# Patient Record
Sex: FEMALE | Race: WHITE | NOT HISPANIC OR LATINO | Employment: FULL TIME | URBAN - METROPOLITAN AREA
[De-identification: names, ages, dates, MRNs, and addresses within clinical notes are randomized per-mention and may not be internally consistent; named-entity substitution may affect disease eponyms.]

---

## 2019-11-25 ENCOUNTER — HOSPITAL ENCOUNTER (EMERGENCY)
Facility: HOSPITAL | Age: 29
Discharge: HOME | End: 2019-11-25
Attending: EMERGENCY MEDICINE | Admitting: EMERGENCY MEDICINE
Payer: OTHER MISCELLANEOUS

## 2019-11-25 VITALS
DIASTOLIC BLOOD PRESSURE: 71 MMHG | TEMPERATURE: 97.9 F | OXYGEN SATURATION: 99 % | SYSTOLIC BLOOD PRESSURE: 137 MMHG | WEIGHT: 219.8 LBS | HEART RATE: 84 BPM | RESPIRATION RATE: 18 BRPM

## 2019-11-25 DIAGNOSIS — Z20.3 NEED FOR POST EXPOSURE PROPHYLAXIS FOR RABIES: Primary | ICD-10-CM

## 2019-11-25 PROCEDURE — 96372 THER/PROPH/DIAG INJ SC/IM: CPT

## 2019-11-25 PROCEDURE — 90472 IMMUNIZATION ADMIN EACH ADD: CPT

## 2019-11-25 PROCEDURE — 90375 RABIES IG IM/SC: CPT | Performed by: EMERGENCY MEDICINE

## 2019-11-25 PROCEDURE — 90715 TDAP VACCINE 7 YRS/> IM: CPT | Performed by: EMERGENCY MEDICINE

## 2019-11-25 PROCEDURE — 99281 EMR DPT VST MAYX REQ PHY/QHP: CPT

## 2019-11-25 PROCEDURE — 90675 RABIES VACCINE IM: CPT | Performed by: EMERGENCY MEDICINE

## 2019-11-25 PROCEDURE — 99282 EMERGENCY DEPT VISIT SF MDM: CPT | Performed by: EMERGENCY MEDICINE

## 2019-11-25 PROCEDURE — 90471 IMMUNIZATION ADMIN: CPT

## 2019-11-25 RX ADMIN — RABIES IMMUNE GLOBULIN (HUMAN) 1980 UNITS: 300 INJECTION, SOLUTION INFILTRATION; INTRAMUSCULAR at 16:36

## 2019-11-25 RX ADMIN — Medication 1 ML: at 16:35

## 2019-11-25 RX ADMIN — TETANUS TOXOID, REDUCED DIPHTHERIA TOXOID AND ACELLULAR PERTUSSIS VACCINE, ADSORBED 0.5 ML: 5; 2.5; 8; 8; 2.5 SUSPENSION INTRAMUSCULAR at 16:38

## 2019-11-25 NOTE — ED PROVIDER NOTES
History  Chief Complaint   Patient presents with    Follow Up Rabies     Pt reports she was told to come in and get the rabies vaccine after getting bit by an unvaccinated dog  Pt has not started series yet  34year old female patient presents to the ED with cc of dog bite  There is a small puncture on the right wrist  She does not require medical care for this but the dog only got its initial vaccine and no booster so she will get the rabies vaccine and immunoglobulin  History provided by:  Patient   used: No    Dog Bite   Contact animal:  Dog  Location:  Hand  Hand injury location:  R wrist  Pain details:     Severity:  No pain    Timing:  Constant    Progression:  Unchanged  Incident location:  Outside  Provoked: unprovoked    Notifications:  Animal control  Animal in possession: yes    Relieved by:  Nothing  Worsened by:  Nothing  Ineffective treatments:  None tried  Associated symptoms: no fever, no numbness, no rash and no swelling        None       History reviewed  No pertinent past medical history  History reviewed  No pertinent surgical history  History reviewed  No pertinent family history  I have reviewed and agree with the history as documented  Social History     Tobacco Use    Smoking status: Never Smoker    Smokeless tobacco: Never Used   Substance Use Topics    Alcohol use: Yes     Comment: socially    Drug use: Never        Review of Systems   Constitutional: Negative for fever  Skin: Negative for rash  Neurological: Negative for numbness  All other systems reviewed and are negative  Physical Exam  Physical Exam   Constitutional: She is oriented to person, place, and time  She appears well-developed and well-nourished  HENT:   Head: Normocephalic and atraumatic  Right Ear: External ear normal    Left Ear: External ear normal    Eyes: Conjunctivae and EOM are normal    Neck: No JVD present  No tracheal deviation present   No thyromegaly present  Cardiovascular: Normal rate  Pulmonary/Chest: Effort normal and breath sounds normal  No stridor  Abdominal: Soft  She exhibits no distension and no mass  There is no tenderness  There is no guarding  No hernia  Musculoskeletal: Normal range of motion  She exhibits no edema, tenderness or deformity  Lymphadenopathy:     She has no cervical adenopathy  Neurological: She is alert and oriented to person, place, and time  Skin: Skin is warm  No rash noted  No erythema  No pallor  Psychiatric: She has a normal mood and affect  Her behavior is normal    Nursing note and vitals reviewed        Vital Signs  ED Triage Vitals [11/25/19 1558]   Temperature Pulse Respirations Blood Pressure SpO2   97 9 °F (36 6 °C) 84 18 137/71 99 %      Temp Source Heart Rate Source Patient Position - Orthostatic VS BP Location FiO2 (%)   Oral Monitor Sitting Left arm --      Pain Score       --           Vitals:    11/25/19 1558   BP: 137/71   Pulse: 84   Patient Position - Orthostatic VS: Sitting         Visual Acuity      ED Medications  Medications   rabies vaccine, human diploid (IMOVAX RABIES) IM injection 1 mL (1 mL Intramuscular Given 11/25/19 1635)   tetanus-diphtheria-acellular pertussis (BOOSTRIX) IM injection 0 5 mL (0 5 mL Intramuscular Given 11/25/19 1638)   rabies immune globulin, human (HyperRAB) injection 1,980 Units (1,980 Units Infiltration Given 11/25/19 1636)       Diagnostic Studies  Results Reviewed     None                 No orders to display              Procedures  Procedures       ED Course                               MDM  Number of Diagnoses or Management Options  Need for post exposure prophylaxis for rabies: new and requires workup     Amount and/or Complexity of Data Reviewed  Decide to obtain previous medical records or to obtain history from someone other than the patient: yes  Review and summarize past medical records: yes    Patient Progress  Patient progress: stable      Disposition  Final diagnoses:   Need for post exposure prophylaxis for rabies     Time reflects when diagnosis was documented in both MDM as applicable and the Disposition within this note     Time User Action Codes Description Comment    11/25/2019  4:32 PM Emani Kathikarena Add [Z20 3] Need for post exposure prophylaxis for rabies       ED Disposition     ED Disposition Condition Date/Time Comment    Discharge Stable Mon Nov 25, 2019  4:32 PM Blaire Chung discharge to home/self care  Follow-up Information     Follow up With Specialties Details Why Contact Info Additional Information    [address] [facility] Emergency Department Emergency Medicine   [address] [facility] [id number] [phone] [place] ED, [address] [place], [place], [address] Now [address] Urgent [facility]   [address] [place] [address]  [phone] [facility] NOW, [address], [address] [facility]          There are no discharge medications for this patient  No discharge procedures on file      ED Provider  Electronically Signed by           Merari Conner DO  11/26/19 8170

## 2019-11-28 ENCOUNTER — APPOINTMENT (EMERGENCY)
Dept: RADIOLOGY | Facility: HOSPITAL | Age: 29
End: 2019-11-28

## 2019-11-28 ENCOUNTER — HOSPITAL ENCOUNTER (EMERGENCY)
Facility: HOSPITAL | Age: 29
Discharge: HOME | End: 2019-11-28
Attending: EMERGENCY MEDICINE

## 2019-11-28 VITALS
RESPIRATION RATE: 18 BRPM | BODY MASS INDEX: 35.32 KG/M2 | HEIGHT: 66 IN | DIASTOLIC BLOOD PRESSURE: 69 MMHG | WEIGHT: 219.8 LBS | HEART RATE: 98 BPM | SYSTOLIC BLOOD PRESSURE: 141 MMHG | OXYGEN SATURATION: 99 % | TEMPERATURE: 97.5 F

## 2019-11-28 DIAGNOSIS — S60.10XA: Primary | ICD-10-CM

## 2019-11-28 DIAGNOSIS — S67.10XA CRUSH INJURY TO FINGER: ICD-10-CM

## 2019-11-28 DIAGNOSIS — S62.639B OPEN FRACTURE OF TUFT OF DISTAL PHALANX OF FINGER: ICD-10-CM

## 2019-11-28 PROCEDURE — 73130 X-RAY EXAM OF HAND: CPT

## 2019-11-28 PROCEDURE — 99284 EMERGENCY DEPT VISIT MOD MDM: CPT

## 2019-11-28 PROCEDURE — 99284 EMERGENCY DEPT VISIT MOD MDM: CPT | Performed by: EMERGENCY MEDICINE

## 2019-11-28 RX ORDER — CEPHALEXIN 500 MG/1
500 CAPSULE ORAL EVERY 6 HOURS SCHEDULED
Qty: 20 CAPSULE | Refills: 0 | Status: SHIPPED | OUTPATIENT
Start: 2019-11-28 | End: 2019-12-03

## 2019-11-28 RX ORDER — CEPHALEXIN 250 MG/1
500 CAPSULE ORAL ONCE
Status: COMPLETED | OUTPATIENT
Start: 2019-11-28 | End: 2019-11-28

## 2019-11-28 RX ORDER — LIDOCAINE HYDROCHLORIDE 20 MG/ML
5 INJECTION, SOLUTION EPIDURAL; INFILTRATION; INTRACAUDAL; PERINEURAL ONCE
Status: COMPLETED | OUTPATIENT
Start: 2019-11-28 | End: 2019-11-28

## 2019-11-28 RX ADMIN — LIDOCAINE HYDROCHLORIDE 5 ML: 20 INJECTION, SOLUTION EPIDURAL; INFILTRATION; INTRACAUDAL; PERINEURAL at 04:10

## 2019-11-28 RX ADMIN — CEPHALEXIN 500 MG: 250 CAPSULE ORAL at 04:13

## 2019-11-28 NOTE — ED PROVIDER NOTES
History  Chief Complaint   Patient presents with    Hand Injury     wood door slammed on right hand, presents with c/o pain     59-year-old female presents with crush injury to the right 3rd and 4th digits in a door  Patient notes pain over the distal fingers in the area of the distal phalanx, beneath the nails  Patient has nail [place] on but there appears to be a subungual hematomas in these regions  Patient denies any pain proximal to this or pain anywhere else on her body  Patient denies any difficulty with range of motion  Impression and plan:  Hand injury  Will obtain plain film imaging to evaluate for potential tuft fractures or other fractures to the distal phalanx  Will remove patient's nail Polish to more fully evaluate for subungual hematomas  Will monitor and reassess need for additional treatment parent      Hand Injury   Location:  Hand  Hand location:  R hand  Injury: yes    Mechanism of injury: crush    Crush injury:     Mechanism:  Door  Pain details:     Quality:  Pressure and throbbing    Radiates to:  Does not radiate    Severity:  Moderate    Onset quality:  Sudden    Timing:  Constant    Progression:  Worsening  Relieved by: Ice  Worsened by:  Nothing  Associated symptoms: swelling    Associated symptoms: no back pain, no decreased range of motion, no fatigue, no fever, no muscle weakness, no neck pain, no numbness, no stiffness and no tingling        None       History reviewed  No pertinent past medical history  History reviewed  No pertinent surgical history  History reviewed  No pertinent family history  I have reviewed and agree with the history as documented  Social History     Tobacco Use    Smoking status: Never Smoker    Smokeless tobacco: Never Used   Substance Use Topics    Alcohol use: Yes     Comment: socially    Drug use: Never        Review of Systems   Constitutional: Negative for fatigue and fever     Musculoskeletal: Negative for back pain, neck pain and stiffness  All other systems reviewed and are negative  Physical Exam  Physical Exam   Constitutional: She appears well-developed and well-nourished  She appears distressed  HENT:   Head: Normocephalic and atraumatic  Eyes: Pupils are equal, round, and reactive to light  Neck: Normal range of motion  Neck supple  Cardiovascular: Normal rate and regular rhythm  Pulmonary/Chest: Effort normal    Abdominal: She exhibits no distension  Musculoskeletal: She exhibits tenderness  She exhibits no deformity  Right hand:  Likely subungual hematomas in the 3rd and 4th digits  Otherwise normal inspection of the hand  Tenderness over the distal phalanx is the 3rd and 4th right digits  No tenderness in the remainder of the hand including no tenderness at the wrist   Normal range of motion  Normal distal neurovascular exam    Neurological: She is alert  Skin: Skin is warm and dry  She is not diaphoretic  Psychiatric: She has a normal mood and affect  Vitals reviewed  Vital Signs  ED Triage Vitals [11/28/19 0225]   Temperature Pulse Respirations Blood Pressure SpO2   97 5 °F (36 4 °C) 98 18 141/69 99 %      Temp Source Heart Rate Source Patient Position - Orthostatic VS BP Location FiO2 (%)   Oral Monitor Sitting Left arm --      Pain Score       4           Vitals:    11/28/19 0225   BP: 141/69   Pulse: 98   Patient Position - Orthostatic VS: Sitting         Visual Acuity      ED Medications  Medications   lidocaine (PF) (XYLOCAINE-MPF) 2 % injection 5 mL (5 mL Infiltration Given 11/28/19 0410)   cephalexin (KEFLEX) capsule 500 mg (500 mg Oral Given 11/28/19 0413)       Diagnostic Studies  Results Reviewed     None                 XR hand 3+ views RIGHT   ED Interpretation by Dominik Rose MD (11/28 0567)   Abnormal   3rd digit tuft fx      Final Result by Addy Harper DO (11/28 3925)      Acute 3rd digit tuft fracture              Workstation performed: [id number] Procedures  Procedures       ED Course  ED Course as of Nov 29 0132   Thu Nov 28, 2019   0401 Patient's finger was anesthetized with a proximal nerve block after cleaning with chlorhexidine  1 mL of 2% lidocaine was administered to the 3rd and 4th digits  Patient's 3rd and 4th digits trephinated with electric cautery with return of blood and improvement of the color of the proximal nail bed  Patient will be placed on antibiotics due to open fracture  Patient to follow with Orthopedics for reassessment and was placed in a thermoform splint to isolate the distal interphalangeal joint  I discussed follow-up and return precautions in detail  Discussed continued antibiotic use and symptomatic management including ice and elevation  Discussed splint care  MDM    Disposition  Final diagnoses:   Subungual hematoma, fingernail   Open fracture of tuft of distal phalanx of finger - right 3rd digit   Crush injury to finger     Time reflects when diagnosis was documented in both MDM as applicable and the Disposition within this note     Time User Action Codes Description Comment    11/28/2019  4:09 AM Rheba Odessa Add [S60 10XA] Subungual hematoma, fingernail     11/28/2019  4:09 AM Rheba Wanda Add [S62 639B] Open fracture of tuft of distal phalanx of finger     11/28/2019  4:09 AM Rheba Wanda Modify [S62 639B] Open fracture of tuft of distal phalanx of finger right 3rd digit    11/28/2019  4:09 AM Rheba Wanda Add [L78 56EA] Crush injury to finger       ED Disposition     ED Disposition Condition Date/Time Comment    Discharge Stable Thu Nov 28, 2019  4:08 AM Dorina Angela discharge to home/self care              Follow-up Information     Follow up With Specialties Details Why Contact Info Additional Information    Obey Meade MD Orthopedic Surgery, Hand Surgery, Orthopedics Schedule an appointment as soon as possible for a visit in 1 week Follow up on finger fracture  [address] [address]  [address], Emergency Department Emergency Medicine Go to  If symptoms worsen [address] [facility] Antoni Celaya [address] ED, [address] [place], [place], [address]          Discharge Medication List as of 11/28/2019  4:12 AM      START taking these medications    Details   cephalexin (KEFLEX) 500 mg capsule Take 1 capsule (500 mg total) by mouth every 6 (six) hours for 5 days, Starting Thu 11/28/2019, Until Tue 12/3/2019, Print           No discharge procedures on file      ED Provider  Electronically Signed by           Silver Kasper MD  11/29/19 7972

## 2019-11-28 NOTE — DISCHARGE INSTRUCTIONS
Keep splint in place for at least 2 weeks and up to 4 weeks until finger completely healed  Follow up with hand surgery for reassessment

## 2019-11-29 ENCOUNTER — APPOINTMENT (OUTPATIENT)
Dept: URGENT CARE | Facility: MEDICAL CENTER | Age: 29
End: 2019-11-29
Payer: OTHER MISCELLANEOUS

## 2019-11-29 PROCEDURE — 90675 RABIES VACCINE IM: CPT

## 2019-11-29 PROCEDURE — 99283 EMERGENCY DEPT VISIT LOW MDM: CPT | Performed by: PHYSICIAN ASSISTANT

## 2019-11-29 PROCEDURE — 90471 IMMUNIZATION ADMIN: CPT | Performed by: PREVENTIVE MEDICINE

## 2019-11-29 PROCEDURE — G0382 LEV 3 HOSP TYPE B ED VISIT: HCPCS | Performed by: PHYSICIAN ASSISTANT

## 2019-12-03 ENCOUNTER — OCCMED (OUTPATIENT)
Dept: URGENT CARE | Facility: MEDICAL CENTER | Age: 29
End: 2019-12-03
Payer: OTHER MISCELLANEOUS

## 2019-12-03 PROCEDURE — 90675 RABIES VACCINE IM: CPT

## 2019-12-03 PROCEDURE — 90471 IMMUNIZATION ADMIN: CPT

## 2019-12-10 ENCOUNTER — APPOINTMENT (OUTPATIENT)
Dept: URGENT CARE | Facility: CLINIC | Age: 29
End: 2019-12-10
Payer: OTHER MISCELLANEOUS

## 2019-12-10 PROCEDURE — 99213 OFFICE O/P EST LOW 20 MIN: CPT

## 2023-10-30 ENCOUNTER — HOSPITAL ENCOUNTER (EMERGENCY)
Facility: HOSPITAL | Age: 33
Discharge: HOME | End: 2023-10-30
Attending: EMERGENCY MEDICINE
Payer: COMMERCIAL

## 2023-10-30 ENCOUNTER — APPOINTMENT (EMERGENCY)
Dept: CT IMAGING | Facility: HOSPITAL | Age: 33
End: 2023-10-30
Payer: COMMERCIAL

## 2023-10-30 ENCOUNTER — APPOINTMENT (EMERGENCY)
Dept: RADIOLOGY | Facility: HOSPITAL | Age: 33
End: 2023-10-30
Payer: COMMERCIAL

## 2023-10-30 VITALS
SYSTOLIC BLOOD PRESSURE: 130 MMHG | RESPIRATION RATE: 18 BRPM | DIASTOLIC BLOOD PRESSURE: 70 MMHG | OXYGEN SATURATION: 100 % | HEART RATE: 82 BPM | TEMPERATURE: 98 F

## 2023-10-30 DIAGNOSIS — R00.2 PALPITATIONS: Primary | ICD-10-CM

## 2023-10-30 DIAGNOSIS — R07.9 CHEST PAIN: ICD-10-CM

## 2023-10-30 LAB
ALBUMIN SERPL BCP-MCNC: 4.4 G/DL (ref 3.5–5)
ALP SERPL-CCNC: 64 U/L (ref 34–104)
ALT SERPL W P-5'-P-CCNC: 40 U/L (ref 7–52)
ANION GAP SERPL CALCULATED.3IONS-SCNC: 7 MMOL/L
AST SERPL W P-5'-P-CCNC: 26 U/L (ref 13–39)
BASOPHILS # BLD AUTO: 0.05 THOUSANDS/ÂΜL (ref 0–0.1)
BASOPHILS NFR BLD AUTO: 1 % (ref 0–1)
BILIRUB SERPL-MCNC: 0.43 MG/DL (ref 0.2–1)
BILIRUB UR QL STRIP: NEGATIVE
BUN SERPL-MCNC: 10 MG/DL (ref 5–25)
CALCIUM SERPL-MCNC: 9.7 MG/DL (ref 8.4–10.2)
CARDIAC TROPONIN I PNL SERPL HS: <2 NG/L
CHLORIDE SERPL-SCNC: 104 MMOL/L (ref 96–108)
CLARITY UR: CLEAR
CO2 SERPL-SCNC: 23 MMOL/L (ref 21–32)
COLOR UR: COLORLESS
CREAT SERPL-MCNC: 0.64 MG/DL (ref 0.6–1.3)
D DIMER PPP FEU-MCNC: 0.71 UG/ML FEU
EOSINOPHIL # BLD AUTO: 0.11 THOUSAND/ÂΜL (ref 0–0.61)
EOSINOPHIL NFR BLD AUTO: 1 % (ref 0–6)
ERYTHROCYTE [DISTWIDTH] IN BLOOD BY AUTOMATED COUNT: 12 % (ref 11.6–15.1)
GFR SERPL CREATININE-BSD FRML MDRD: 117 ML/MIN/1.73SQ M
GLUCOSE SERPL-MCNC: 92 MG/DL (ref 65–140)
GLUCOSE UR STRIP-MCNC: NEGATIVE MG/DL
HCG SERPL QL: NEGATIVE
HCT VFR BLD AUTO: 44.3 % (ref 34.8–46.1)
HGB BLD-MCNC: 15 G/DL (ref 11.5–15.4)
HGB UR QL STRIP.AUTO: NEGATIVE
IMM GRANULOCYTES # BLD AUTO: 0.03 THOUSAND/UL (ref 0–0.2)
IMM GRANULOCYTES NFR BLD AUTO: 0 % (ref 0–2)
KETONES UR STRIP-MCNC: NEGATIVE MG/DL
LEUKOCYTE ESTERASE UR QL STRIP: NEGATIVE
LYMPHOCYTES # BLD AUTO: 2.57 THOUSANDS/ÂΜL (ref 0.6–4.47)
LYMPHOCYTES NFR BLD AUTO: 28 % (ref 14–44)
MCH RBC QN AUTO: 28.6 PG (ref 26.8–34.3)
MCHC RBC AUTO-ENTMCNC: 33.9 G/DL (ref 31.4–37.4)
MCV RBC AUTO: 85 FL (ref 82–98)
MONOCYTES # BLD AUTO: 0.58 THOUSAND/ÂΜL (ref 0.17–1.22)
MONOCYTES NFR BLD AUTO: 6 % (ref 4–12)
NEUTROPHILS # BLD AUTO: 5.97 THOUSANDS/ÂΜL (ref 1.85–7.62)
NEUTS SEG NFR BLD AUTO: 64 % (ref 43–75)
NITRITE UR QL STRIP: NEGATIVE
NRBC BLD AUTO-RTO: 0 /100 WBCS
PH UR STRIP.AUTO: 5.5 [PH]
PLATELET # BLD AUTO: 256 THOUSANDS/UL (ref 149–390)
PMV BLD AUTO: 10.1 FL (ref 8.9–12.7)
POTASSIUM SERPL-SCNC: 3.4 MMOL/L (ref 3.5–5.3)
PROT SERPL-MCNC: 8.4 G/DL (ref 6.4–8.4)
PROT UR STRIP-MCNC: NEGATIVE MG/DL
RBC # BLD AUTO: 5.24 MILLION/UL (ref 3.81–5.12)
SODIUM SERPL-SCNC: 134 MMOL/L (ref 135–147)
SP GR UR STRIP.AUTO: 1.03 (ref 1–1.03)
TSH SERPL DL<=0.05 MIU/L-ACNC: 1.94 UIU/ML (ref 0.45–4.5)
UROBILINOGEN UR STRIP-ACNC: <2 MG/DL
WBC # BLD AUTO: 9.31 THOUSAND/UL (ref 4.31–10.16)

## 2023-10-30 PROCEDURE — 99285 EMERGENCY DEPT VISIT HI MDM: CPT | Performed by: EMERGENCY MEDICINE

## 2023-10-30 PROCEDURE — G1004 CDSM NDSC: HCPCS

## 2023-10-30 PROCEDURE — 71275 CT ANGIOGRAPHY CHEST: CPT

## 2023-10-30 PROCEDURE — 71046 X-RAY EXAM CHEST 2 VIEWS: CPT

## 2023-10-30 PROCEDURE — 81003 URINALYSIS AUTO W/O SCOPE: CPT | Performed by: EMERGENCY MEDICINE

## 2023-10-30 PROCEDURE — 84443 ASSAY THYROID STIM HORMONE: CPT | Performed by: EMERGENCY MEDICINE

## 2023-10-30 PROCEDURE — 84484 ASSAY OF TROPONIN QUANT: CPT | Performed by: EMERGENCY MEDICINE

## 2023-10-30 PROCEDURE — 85025 COMPLETE CBC W/AUTO DIFF WBC: CPT | Performed by: EMERGENCY MEDICINE

## 2023-10-30 PROCEDURE — 80053 COMPREHEN METABOLIC PANEL: CPT | Performed by: EMERGENCY MEDICINE

## 2023-10-30 PROCEDURE — 96361 HYDRATE IV INFUSION ADD-ON: CPT

## 2023-10-30 PROCEDURE — 85379 FIBRIN DEGRADATION QUANT: CPT | Performed by: EMERGENCY MEDICINE

## 2023-10-30 PROCEDURE — 96360 HYDRATION IV INFUSION INIT: CPT

## 2023-10-30 PROCEDURE — 36415 COLL VENOUS BLD VENIPUNCTURE: CPT

## 2023-10-30 PROCEDURE — 84703 CHORIONIC GONADOTROPIN ASSAY: CPT | Performed by: EMERGENCY MEDICINE

## 2023-10-30 PROCEDURE — 99285 EMERGENCY DEPT VISIT HI MDM: CPT

## 2023-10-30 PROCEDURE — 93005 ELECTROCARDIOGRAM TRACING: CPT

## 2023-10-30 RX ADMIN — SODIUM CHLORIDE 1000 ML: 0.9 INJECTION, SOLUTION INTRAVENOUS at 16:40

## 2023-10-30 RX ADMIN — IOHEXOL 100 ML: 350 INJECTION, SOLUTION INTRAVENOUS at 17:14

## 2023-10-30 NOTE — ED PROVIDER NOTES
History  Chief Complaint   Patient presents with    Palpitations     Pt reports palpitations on going for months, states it worse when she has difficulty sleeping. Palpitations worsening over the past 4 days. Believes she had a panic attack last night where palpitations were severe     36 y/o female presents to the ED for chest pain and palpitations. Patient states that 2 nights ago she was drinking with her friends and did not get good sleep. States that the next day she had chest tightness and bilateral arm/ leg numbness. States that last night she developed anxiety, heart racing palpitations, and chest tightness. States that the anxiety and palpitations are improved but she continues to have constant chest tightness in the L side of her chest. She denies any fever, cough, sob, n/v, abd pain, and urinary symptoms. No other complaints. Has not tried anything for the symptoms       History provided by:  Patient  Chest Pain  Pain location:  L chest  Pain quality: tightness    Pain radiates to:  Does not radiate  Onset quality:  Sudden  Duration:  1 day  Timing:  Constant  Chronicity:  New  Relieved by:  None tried  Worsened by:  Nothing tried  Ineffective treatments:  None tried  Associated symptoms: palpitations    Associated symptoms: no abdominal pain, no back pain, no cough, no fever, no headache, no nausea, no numbness, no shortness of breath, not vomiting and no weakness        None       History reviewed. No pertinent past medical history. History reviewed. No pertinent surgical history. History reviewed. No pertinent family history. I have reviewed and agree with the history as documented. E-Cigarette/Vaping     E-Cigarette/Vaping Substances     Social History     Tobacco Use    Smoking status: Never    Smokeless tobacco: Never   Substance Use Topics    Alcohol use: Yes     Comment: socially    Drug use: Never       Review of Systems   Constitutional:  Negative for chills and fever.    HENT: Negative for congestion, ear pain and sore throat. Eyes:  Negative for pain and visual disturbance. Respiratory:  Negative for cough, shortness of breath and wheezing. Cardiovascular:  Positive for chest pain and palpitations. Negative for leg swelling. Gastrointestinal:  Negative for abdominal pain, diarrhea, nausea and vomiting. Genitourinary:  Negative for dysuria, frequency, hematuria and urgency. Musculoskeletal:  Negative for back pain, neck pain and neck stiffness. Skin:  Negative for rash and wound. Neurological:  Negative for weakness, numbness and headaches. Psychiatric/Behavioral:  Negative for agitation and confusion. All other systems reviewed and are negative. Physical Exam  Physical Exam  Vitals and nursing note reviewed. Constitutional:       Appearance: She is well-developed. HENT:      Head: Normocephalic and atraumatic. Eyes:      Pupils: Pupils are equal, round, and reactive to light. Cardiovascular:      Rate and Rhythm: Regular rhythm. Tachycardia present. Pulmonary:      Effort: Pulmonary effort is normal.      Breath sounds: Normal breath sounds. Abdominal:      General: Bowel sounds are normal. There is no distension. Palpations: Abdomen is soft. Tenderness: There is no abdominal tenderness. Musculoskeletal:         General: Normal range of motion. Cervical back: Normal range of motion and neck supple. Skin:     General: Skin is warm and dry. Neurological:      General: No focal deficit present. Mental Status: She is alert and oriented to person, place, and time.       Comments: No focal deficits         Vital Signs  ED Triage Vitals [10/30/23 1408]   Temperature Pulse Respirations Blood Pressure SpO2   97.7 °F (36.5 °C) (!) 106 20 153/82 100 %      Temp Source Heart Rate Source Patient Position - Orthostatic VS BP Location FiO2 (%)   Temporal Monitor Sitting Left arm --      Pain Score       --           Vitals:    10/30/23 1408 10/30/23 1640 10/30/23 1906 10/30/23 1942   BP: 153/82 123/60 131/73 130/70   Pulse: (!) 106 74 85 82   Patient Position - Orthostatic VS: Sitting Lying Lying Sitting         Visual Acuity  Visual Acuity      Flowsheet Row Most Recent Value   L Pupil Size (mm) 3   R Pupil Size (mm) 3            ED Medications  Medications   sodium chloride 0.9 % bolus 1,000 mL (0 mL Intravenous Stopped 10/30/23 1941)   iohexol (OMNIPAQUE) 350 MG/ML injection (MULTI-DOSE) 100 mL (100 mL Intravenous Given 10/30/23 1714)       Diagnostic Studies  Results Reviewed       Procedure Component Value Units Date/Time    HS Troponin I 4hr [[id number]] Collected: 10/30/23 1832    Lab Status: Final result Specimen: Blood from Arm, Left Updated: 10/30/23 1927     hs TnI 4hr <2 ng/L      Delta 4hr hsTnI --    UA w Reflex to Microscopic w Reflex to Culture [[id number]] Collected: 10/30/23 1822    Lab Status: Final result Specimen: Urine, Clean Catch Updated: 10/30/23 1830     Color, UA Colorless     Clarity, UA Clear     Specific Gravity, UA 1.027     pH, UA 5.5     Leukocytes, UA Negative     Nitrite, UA Negative     Protein, UA Negative mg/dl      Glucose, UA Negative mg/dl      Ketones, UA Negative mg/dl      Urobilinogen, UA <2.0 mg/dl      Bilirubin, UA Negative     Occult Blood, UA Negative    TSH, 3rd generation with Free T4 reflex [[id number]]  (Normal) Collected: 10/30/23 1413    Lab Status: Final result Specimen: Blood from Arm, Right Updated: 10/30/23 1806     TSH 3RD GENERATON 1.943 uIU/mL     HS Troponin I 2hr [[id number]] Collected: 10/30/23 1639    Lab Status: Final result Specimen: Blood from Arm, Left Updated: 10/30/23 1712     hs TnI 2hr <2 ng/L      Delta 2hr hsTnI --    hCG, qualitative pregnancy [[id number]]  (Normal) Collected: 10/30/23 1413    Lab Status: Final result Specimen: Blood from Arm, Right Updated: 10/30/23 1705     Preg, Serum Negative    D-dimer, quantitative [[id number]]  (Abnormal) Collected: 10/30/23 1413    Lab Status: Final result Specimen: Blood from Arm, Right Updated: 10/30/23 1704     D-Dimer, Quant 0.71 ug/ml FEU     HS Troponin 0hr (reflex protocol) [[id number]]  (Normal) Collected: 10/30/23 1413    Lab Status: Final result Specimen: Blood from Arm, Right Updated: 10/30/23 1506     hs TnI 0hr <2 ng/L     Comprehensive metabolic panel [[id number]]  (Abnormal) Collected: 10/30/23 1413    Lab Status: Final result Specimen: Blood from Arm, Right Updated: 10/30/23 1439     Sodium 134 mmol/L      Potassium 3.4 mmol/L      Chloride 104 mmol/L      CO2 23 mmol/L      ANION GAP 7 mmol/L      BUN 10 mg/dL      Creatinine 0.64 mg/dL      Glucose 92 mg/dL      Calcium 9.7 mg/dL      AST 26 U/L      ALT 40 U/L      Alkaline Phosphatase 64 U/L      Total Protein 8.4 g/dL      Albumin 4.4 g/dL      Total Bilirubin 0.43 mg/dL      eGFR 117 ml/min/1.73sq m     Narrative:      [place] guidelines for Chronic Kidney Disease (CKD):     Stage 1 with normal or high GFR (GFR > 90 mL/min/1.73 square meters)    Stage 2 Mild CKD (GFR = 60-89 mL/min/1.73 square meters)    Stage 3A Moderate CKD (GFR = 45-59 mL/min/1.73 square meters)    Stage 3B Moderate CKD (GFR = 30-44 mL/min/1.73 square meters)    Stage 4 Severe CKD (GFR = 15-29 mL/min/1.73 square meters)    Stage 5 End Stage CKD (GFR <15 mL/min/1.73 square meters)  Note: GFR calculation is accurate only with a steady state creatinine    CBC and differential [[id number]]  (Abnormal) Collected: 10/30/23 1413    Lab Status: Final result Specimen: Blood from Arm, Right Updated: 10/30/23 1418     WBC 9.31 Thousand/uL      RBC 5.24 Million/uL      Hemoglobin 15.0 g/dL      Hematocrit 44.3 %      MCV 85 fL      MCH 28.6 pg      MCHC 33.9 g/dL      RDW 12.0 %      MPV 10.1 fL      Platelets 000 Thousands/uL      nRBC 0 /100 WBCs      Neutrophils Relative 64 %      Immat GRANS % 0 %      Lymphocytes Relative 28 %      Monocytes Relative 6 %      Eosinophils Relative 1 % Basophils Relative 1 %      Neutrophils Absolute 5.97 Thousands/µL      Immature Grans Absolute 0.03 Thousand/uL      Lymphocytes Absolute 2.57 Thousands/µL      Monocytes Absolute 0.58 Thousand/µL      Eosinophils Absolute 0.11 Thousand/µL      Basophils Absolute 0.05 Thousands/µL                    CTA ED chest PE Study   Final Result by Ana Hillman MD (10/30 1814)      No pulmonary embolism or additional acute abnormality in the chest.         Workstation performed: [id number]         XR chest 2 views   Final Result by Phyllis Simpson MD (10/31 7047)      No acute cardiopulmonary disease. Workstation performed: [id number]                    Procedures  ECG 12 Lead Documentation Only    Date/Time: 10/30/2023 6:07 PM    Performed by: Imtiaz Marr DO  Authorized by: Imtiaz Marr DO    Indications / Diagnosis:  Chest pain  Patient location:  ED  Rate:     ECG rate:  99    ECG rate assessment: normal    Rhythm:     Rhythm: sinus rhythm    Ectopy:     Ectopy: none    QRS:     QRS axis:  Normal    QRS intervals:  Normal  Conduction:     Conduction: normal    ST segments:     ST segments:  Normal  T waves:     T waves: normal             ED Course  ED Course as of 11/01/23 1213   Mon Oct 30, 2023   1705 D-Dimer, Quant(!): 0.71             HEART Risk Score      Flowsheet Row Most Recent Value   Heart Score Risk Calculator    History 0 Filed at: 10/30/2023 1808   ECG 0 Filed at: 10/30/2023 1808   Age 0 Filed at: 10/30/2023 1808   Risk Factors 1 Filed at: 10/30/2023 1808   Troponin 0 Filed at: 10/30/2023 1808   HEART Score 1 Filed at: 10/30/2023 1808                          SBIRT 20yo+      Flowsheet Row Most Recent Value   Initial Alcohol Screen: US AUDIT-C     1. How often do you have a drink containing alcohol? 0 Filed at: 10/30/2023 1640   2. How many drinks containing alcohol do you have on a typical day you are drinking? 1 Filed at: 10/30/2023 1640   3a. Male UNDER 65:  How often do you have five or more drinks on one occasion? 0 Filed at: 10/30/2023 1640   3b. FEMALE Any Age, or MALE 65+: How often do you have 4 or more drinks on one occassion? 0 Filed at: 10/30/2023 1640   Audit-C Score 1 Filed at: 10/30/2023 1640   BLANCA: How many times in the past year have you. .. Used an illegal drug or used a prescription medication for non-medical reasons? Never Filed at: 10/30/2023 1640                      Medical Decision Making  34 y/o female with chest tightness and palpitations- will get cardiac workup, tsh, ddimer, and reassess. Case signed out to [facility] pending ct scan, UA, and repeat EKG. Amount and/or Complexity of Data Reviewed  Labs: ordered. Decision-making details documented in ED Course. Radiology: ordered. Risk  Prescription drug management. Disposition  Final diagnoses:   Palpitations   Chest pain     Time reflects when diagnosis was documented in both MDM as applicable and the Disposition within this note       Time User Action Codes Description Comment    10/30/2023  6:12 PM Aleene Nayeli A Add [R00.2] Palpitations     10/30/2023  6:12 PM Aleene Nayeli A Add [R07.9] Chest pain           ED Disposition       ED Disposition   Discharge    Condition   Stable    Date/Time   Mon Oct 30, 2023 1670 [facility] discharge to home/self care.                    Follow-up Information       Follow up With Specialties Details Why Contact Info Additional [address] [facility] [phone] [facility] Call in 1 day for follow up within 2-3 days [address] [place]  [phone]  [address] [place] [address] Emergency Department Emergency Medicine Go to  immediately for any new or worsening sympptoms [address] [facility] [place] [address]  [address] [facility] Emergency Department, [place], [place], [address]            There are no discharge medications for this patient. No discharge procedures on file.     PDMP Review       None            ED Provider  Electronically Signed by             Jhon Álvarez DO  11/01/23 4341

## 2023-10-31 LAB
ATRIAL RATE: 88 BPM
ATRIAL RATE: 99 BPM
P AXIS: 64 DEGREES
P AXIS: 77 DEGREES
PR INTERVAL: 136 MS
PR INTERVAL: 148 MS
QRS AXIS: 28 DEGREES
QRS AXIS: 46 DEGREES
QRSD INTERVAL: 92 MS
QRSD INTERVAL: 92 MS
QT INTERVAL: 348 MS
QT INTERVAL: 354 MS
QTC INTERVAL: 428 MS
QTC INTERVAL: 446 MS
T WAVE AXIS: 27 DEGREES
T WAVE AXIS: 45 DEGREES
VENTRICULAR RATE: 88 BPM
VENTRICULAR RATE: 99 BPM

## 2023-10-31 PROCEDURE — 93010 ELECTROCARDIOGRAM REPORT: CPT | Performed by: INTERNAL MEDICINE

## 2023-11-08 ENCOUNTER — OFFICE VISIT (OUTPATIENT)
Dept: FAMILY MEDICINE CLINIC | Facility: CLINIC | Age: 33
End: 2023-11-08
Payer: COMMERCIAL

## 2023-11-08 VITALS
DIASTOLIC BLOOD PRESSURE: 82 MMHG | WEIGHT: 222.8 LBS | BODY MASS INDEX: 35.81 KG/M2 | HEIGHT: 66 IN | TEMPERATURE: 97.8 F | SYSTOLIC BLOOD PRESSURE: 128 MMHG | HEART RATE: 91 BPM | OXYGEN SATURATION: 98 %

## 2023-11-08 DIAGNOSIS — E87.6 HYPOKALEMIA: ICD-10-CM

## 2023-11-08 DIAGNOSIS — Z00.00 ROUTINE PHYSICAL EXAMINATION: ICD-10-CM

## 2023-11-08 DIAGNOSIS — F41.9 ANXIETY: Primary | ICD-10-CM

## 2023-11-08 DIAGNOSIS — Z76.89 ENCOUNTER TO ESTABLISH CARE: ICD-10-CM

## 2023-11-08 DIAGNOSIS — Z13.220 SCREENING FOR LIPID DISORDERS: ICD-10-CM

## 2023-11-08 PROCEDURE — 99385 PREV VISIT NEW AGE 18-39: CPT

## 2023-11-08 PROCEDURE — 99213 OFFICE O/P EST LOW 20 MIN: CPT

## 2023-11-08 RX ORDER — HYDROXYZINE HYDROCHLORIDE 25 MG/1
25 TABLET, FILM COATED ORAL EVERY 6 HOURS PRN
Qty: 30 TABLET | Refills: 0 | Status: SHIPPED | OUTPATIENT
Start: 2023-11-08

## 2023-11-08 NOTE — PROGRESS NOTES
ADULT ANNUAL [facility]    NAME: Scottie Del Rosario  AGE: 35 y.o. SEX: female  : 1990     DATE: 2023     Assessment and Plan:     Problem List Items Addressed This Visit    None  Visit Diagnoses       Anxiety    -  Primary    Relevant Medications    hydrOXYzine HCL (ATARAX) 25 mg tablet    Other Relevant Orders    Ambulatory referral to Psych Services    Hypokalemia        Relevant Orders    Potassium    Screening for lipid disorders        Relevant Orders    Lipid Panel with Direct LDL reflex    Encounter to establish care        Routine physical examination            Immunizations and preventive care screenings were discussed with patient today. Appropriate education was printed on patient's after visit summary. Patient presents to establish care. No chronic health issues. Not on any daily medication. Recently had lab work completed in the ED. Ordered lipid panel as patient has never had one done before. BP WNL; physical exam unremarkable. Anxiety: patient recently had her first panic attack in which she was evaluated at the ER for (see HPI). Feels like she deals with situational anxiety and has a lot of trauma from having children during covid-19. Patient is interested in potentially talking to a therapist - referral provided at today's visit. Also discussed different medications, including daily medications and as needed. Patient interested in the as needed anti-anxiety medication hydroxyzine. Educated on potential side effects and prescribed at today's visit. Will have follow up visit in four weeks to discuss how she is doing. Hypokalemia: patient had CMP done in the ED which showed a slightly low potassium of 3.4. Repeating in four weeks to reassess. If stays low, can consider a potassium supplement. Counseling:  Exercise: the importance of regular exercise/physical activity was discussed.  Recommend exercise 3-5 times per week for at least 30 minutes. BMI Counseling: Body mass index is 35.96 kg/m². The BMI is above normal. Nutrition recommendations include decreasing portion sizes, encouraging healthy choices of fruits and vegetables, decreasing fast food intake, consuming healthier snacks and limiting drinks that contain sugar. Exercise recommendations include moderate physical activity 150 minutes/week and exercising 3-5 times per week. Rationale for BMI follow-up plan is due to patient being overweight or obese. Depression Screening and Follow-up Plan: Patient was screened for depression during today's encounter. They screened negative with a PHQ-2 score of 0. Return in about 4 weeks (around 12/6/2023). Chief Complaint:     Chief Complaint   Patient presents with    Follow-up     [facility] ED follow up from 10/30, patient was seen for chest pain and heart palpitation, patient reports still getting waves of chest tightness lasting about a half hour that comes and goes    Establish Care    Physical Exam      History of Present Illness:     Adult Annual Physical   Patient here for a comprehensive physical exam. The patient reports no problems. Patient was recently in ED on 10/30/23 for chest pressure/pain. Cardiac work up including a CTA to rule out a PE was completed which was negative. Suspected patient's symptoms were the result of a panic attack. Patient does note she has a hx of anxiety but has never been treated for it before. Notes sometimes she gets episodes of dizziness/chest pressure/palpitations when she is anxious. Did try to see an online therapist once but notes it did not work out. No current chest pain or palpitations. Did offer a cardiology referral for patient to get evaluated, she was not interested at this time. Otherwise no acute concerns and is doing well. Has three year old twins at home that she spends most of her time with. Is here to establish care.      Diet and Physical Activity  Diet/Nutrition: well balanced diet and consuming 3-5 servings of fruits/vegetables daily. Exercise: no formal exercise. Depression Screening  PHQ-2/9 Depression Screening    Little interest or pleasure in doing things: 0 - not at all  Feeling down, depressed, or hopeless: 0 - not at all  PHQ-2 Score: 0  PHQ-2 Interpretation: Negative depression screen       General Health  Sleep: sleeps well. Hearing: normal - bilateral.  Vision: goes for regular eye exams and wears glasses. Dental: regular dental visits and brushes teeth twice daily. /GYN Health  Last menstrual period: last week   Contraceptive method: barrier methods. Review of Systems:     Review of Systems   Constitutional:  Negative for appetite change, chills, diaphoresis, fatigue and fever. HENT:  Negative for congestion, rhinorrhea and sore throat. Eyes:  Negative for visual disturbance. Respiratory:  Negative for cough, chest tightness, shortness of breath and wheezing. Cardiovascular:  Negative for chest pain, palpitations and leg swelling. Gastrointestinal:  Negative for abdominal pain, blood in stool, constipation, diarrhea, nausea and vomiting. Genitourinary:  Negative for difficulty urinating, dysuria, frequency, hematuria and urgency. Neurological:  Negative for dizziness, light-headedness and headaches. Past Medical History:     No past medical history on file. Past Surgical History:     No past surgical history on file.    Social History:     Social History     Socioeconomic History    Marital status: /Civil Union     Spouse name: None    Number of children: None    Years of education: None    Highest education level: None   Occupational History    None   Tobacco Use    Smoking status: Never    Smokeless tobacco: Never   Substance and Sexual Activity    Alcohol use: Yes     Comment: socially    Drug use: Never    Sexual activity: None   Other Topics Concern    None   Social History Narrative    None     Social Determinants of Health     Financial Resource Strain: Not on file   Food Insecurity: Not on file   Transportation Needs: Not on file   Physical Activity: Not on file   Stress: Not on file   Social Connections: Not on file   Intimate Partner Violence: Not on file   Housing Stability: Not on file      Family History:     No family history on file. Current Medications:     Current Outpatient Medications   Medication Sig Dispense Refill    hydrOXYzine HCL (ATARAX) 25 mg tablet Take 1 tablet (25 mg total) by mouth every 6 (six) hours as needed for anxiety 30 tablet 0     No current facility-administered medications for this visit. Allergies:     No Known Allergies   Physical Exam:     /82   Pulse 91   Temp 97.8 °F (36.6 °C)   Ht 5' 6" (1.676 m)   Wt 101 kg (222 lb 12.8 oz)   SpO2 98%   BMI 35.96 kg/m²     Physical Exam  Vitals reviewed. Constitutional:       General: She is not in acute distress. Appearance: Normal appearance. She is not ill-appearing or diaphoretic. HENT:      Head: Normocephalic and atraumatic. Right Ear: Tympanic membrane, ear canal and external ear normal. There is no impacted cerumen. Left Ear: Tympanic membrane, ear canal and external ear normal. There is no impacted cerumen. Nose: Nose normal. No congestion or rhinorrhea. Mouth/Throat:      Mouth: Mucous membranes are moist.      Pharynx: Oropharynx is clear. No oropharyngeal exudate or posterior oropharyngeal erythema. Eyes:      General:         Right eye: No discharge. Left eye: No discharge. Conjunctiva/sclera: Conjunctivae normal.   Cardiovascular:      Rate and Rhythm: Normal rate and regular rhythm. Pulses: Normal pulses. Heart sounds: Normal heart sounds. No murmur heard. Pulmonary:      Effort: Pulmonary effort is normal. No respiratory distress. Breath sounds: Normal breath sounds. No wheezing, rhonchi or rales.    Abdominal: General: Bowel sounds are normal. There is no distension. Palpations: Abdomen is soft. There is no mass. Tenderness: There is no abdominal tenderness. There is no guarding or rebound. Musculoskeletal:         General: Normal range of motion. Cervical back: Normal range of motion and neck supple. Right lower leg: No edema. Left lower leg: No edema. Lymphadenopathy:      Cervical: No cervical adenopathy. Skin:     General: Skin is warm. Capillary Refill: Capillary refill takes less than 2 seconds. Neurological:      Mental Status: She is alert and oriented to person, place, and time.       Gait: Gait normal.   Psychiatric:         Mood and Affect: Mood normal.          Floyd Escamilla PA-C   [address] [facility]

## 2023-11-09 ENCOUNTER — TELEPHONE (OUTPATIENT)
Dept: ADMINISTRATIVE | Facility: OTHER | Age: 33
End: 2023-11-09

## 2023-11-09 NOTE — TELEPHONE ENCOUNTER
----- Message from Mariana Snider sent at 11/8/2023  3:32 PM EST -----  Regarding: Care Gap Request for a Mammogram  11/08/23 3:33 PM    Hello, our patient Ronaldo Chau has had Mammogram completed/performed. Please assist in updating the patient chart by making an External outreach to [facility] facility located in [address] [facility]. The date of service is 2022.     Thank you,  Mariana BURRELL

## 2023-11-09 NOTE — TELEPHONE ENCOUNTER
Upon review of the In Basket request we  There is no Mammogram in the Epic system. Please check with the patient and see if she went somewhere else. She is 35years old and may have not had it done yet as well. Any additional questions or concerns should be emailed to the Practice Liaisons via the appropriate education email address, please do not reply via In Basket.     Thank you  Brea Lopes

## 2023-11-13 ENCOUNTER — TELEPHONE (OUTPATIENT)
Dept: PSYCHIATRY | Facility: CLINIC | Age: 33
End: 2023-11-13

## 2023-12-04 ENCOUNTER — APPOINTMENT (OUTPATIENT)
Dept: LAB | Facility: CLINIC | Age: 33
End: 2023-12-04
Payer: COMMERCIAL

## 2023-12-04 DIAGNOSIS — E87.6 HYPOKALEMIA: ICD-10-CM

## 2023-12-04 DIAGNOSIS — Z13.220 SCREENING FOR LIPID DISORDERS: ICD-10-CM

## 2023-12-04 LAB
CHOLEST SERPL-MCNC: 129 MG/DL
HDLC SERPL-MCNC: 53 MG/DL
LDLC SERPL CALC-MCNC: 64 MG/DL (ref 0–100)
POTASSIUM SERPL-SCNC: 4.6 MMOL/L (ref 3.5–5.3)
TRIGL SERPL-MCNC: 58 MG/DL

## 2023-12-04 PROCEDURE — 36415 COLL VENOUS BLD VENIPUNCTURE: CPT

## 2023-12-04 PROCEDURE — 84132 ASSAY OF SERUM POTASSIUM: CPT

## 2023-12-04 PROCEDURE — 80061 LIPID PANEL: CPT

## 2023-12-06 ENCOUNTER — TELEMEDICINE (OUTPATIENT)
Dept: FAMILY MEDICINE CLINIC | Facility: CLINIC | Age: 33
End: 2023-12-06
Payer: COMMERCIAL

## 2023-12-06 DIAGNOSIS — F41.9 ANXIETY: Primary | ICD-10-CM

## 2023-12-06 PROCEDURE — 99213 OFFICE O/P EST LOW 20 MIN: CPT

## 2023-12-06 NOTE — PROGRESS NOTES
Virtual Regular Visit    Verification of patient location:    Patient is located at Home in the following state in which I hold an active license [place]      Assessment/Plan:    Problem List Items Addressed This Visit       Anxiety - Primary     Patient presents for four week follow up for her anxiety. Doing well; has not had any anxiety attacks since her last visit. Has been working with a therapist online to learn coping mechanisms. Continue hydroxyzine PRN for situational anxiety. Continue scheduled visits with therapist and utilizing coping mechanisms. Follow up in six months as scheduled or sooner as needed. Reason for visit is   Chief Complaint   Patient presents with    Follow-up     Has not started atarax, meditating, working with new therapist     Virtual Regular Visit     Encounter provider Minh Wright PA-C    Provider located at [address] [place], [place] [id number]      Recent Visits  No visits were found meeting these conditions. Showing recent visits within past 7 days and meeting all other requirements  Today's Visits  Date Type Provider Dept   12/06/23 Telemedicine Stephany Almaraz PA-C  Jed [facility]dez   Showing today's visits and meeting all other requirements  Future Appointments  No visits were found meeting these conditions. Showing future appointments within next 150 days and meeting all other requirements       The patient was identified by name and date of birth. Mindi Lincolntingham was informed that this is a telemedicine visit and that the visit is being conducted through the [address] [place] [address] [employer] platform. She agrees to proceed. .  My office door was closed. No one else was in the room. She acknowledged consent and understanding of privacy and security of the video platform. The patient has agreed to participate and understands they can discontinue the visit at any time. Patient is aware this is a billable service. Desmond oRgers is a 35 y.o. female who presents for a four week follow up . CC: anxiety four week follow up     Patient presents for four week anxiety follow up after starting her on hydroxyzine PRN for panic attacks. Patient notes since our last visit she has not had to use the hydroxyzine. She established with a therapist online last week which went very well; they discussed coping mechanisms and breathing techniques to use when she is feeling anxious. Patient notes these techniques have really helped her manage her stress. She notes just knowing she has the hydroxyzine there incase she needs it makes her feel better. Has follow up appointment with her therapist next week. No past medical history on file. No past surgical history on file. Current Outpatient Medications   Medication Sig Dispense Refill    hydrOXYzine HCL (ATARAX) 25 mg tablet Take 1 tablet (25 mg total) by mouth every 6 (six) hours as needed for anxiety 30 tablet 0     No current facility-administered medications for this visit. No Known Allergies    Review of Systems   Respiratory:  Positive for chest tightness (intermittent from the anxiety but much improved from before). Negative for shortness of breath. Cardiovascular:  Negative for chest pain and palpitations. Video Exam    There were no vitals filed for this visit. Physical Exam  Constitutional:       General: She is not in acute distress. Appearance: Normal appearance. She is not ill-appearing or diaphoretic. HENT:      Head: Normocephalic and atraumatic. Right Ear: External ear normal.      Left Ear: External ear normal.      Nose: Nose normal.      Mouth/Throat:      Mouth: Mucous membranes are moist.   Eyes:      General:         Right eye: No discharge. Left eye: No discharge. Conjunctiva/sclera: Conjunctivae normal.   Musculoskeletal:      Cervical back: Normal range of motion.    Neurological:      Mental Status: She is alert.           Visit Time  Total Visit Duration: 10 minutes

## 2025-05-22 ENCOUNTER — TELEPHONE (OUTPATIENT)
Dept: FAMILY MEDICINE CLINIC | Facility: CLINIC | Age: 35
End: 2025-05-22